# Patient Record
(demographics unavailable — no encounter records)

---

## 2020-06-03 NOTE — NUR
Received from OR via daysi, accompanied by Anesthesiologist Annika and report given by 
Anesthesiolgist. VS stable and sats 99% with mask at 10L. Lap sites to abdomen with bandaids 
CDI. Pt responsive to questions but sleepy. 20G right AC present with LR at 100cc/hr. MD 
states okay to give toraol after bag of fluids finishes infusing. Will continue to monitor 
patient closely.

## 2020-06-03 NOTE — NUR
Pt discharged to vehicle by wheelchair after IV DC'd. Abdomen wounds remain CDI. Pt and 
mother verbalized understanding of DC instructions. Pt occasionally feeling nauseous and 
received education on need for movement to move air from surgery in abdomen. They both state 
comfortable to go home. All belongings returned to patient and mother to  pain meds 
already ordered by MD office.